# Patient Record
Sex: MALE | Race: BLACK OR AFRICAN AMERICAN | NOT HISPANIC OR LATINO | URBAN - METROPOLITAN AREA
[De-identification: names, ages, dates, MRNs, and addresses within clinical notes are randomized per-mention and may not be internally consistent; named-entity substitution may affect disease eponyms.]

---

## 2023-12-24 ENCOUNTER — EMERGENCY (EMERGENCY)
Facility: HOSPITAL | Age: 48
LOS: 1 days | Discharge: HOME | End: 2023-12-24
Admitting: EMERGENCY MEDICINE
Payer: OTHER MISCELLANEOUS

## 2023-12-24 VITALS
TEMPERATURE: 99 F | RESPIRATION RATE: 18 BRPM | OXYGEN SATURATION: 100 % | HEART RATE: 93 BPM | SYSTOLIC BLOOD PRESSURE: 177 MMHG | DIASTOLIC BLOOD PRESSURE: 124 MMHG

## 2023-12-24 VITALS
TEMPERATURE: 98 F | OXYGEN SATURATION: 98 % | HEART RATE: 98 BPM | DIASTOLIC BLOOD PRESSURE: 91 MMHG | SYSTOLIC BLOOD PRESSURE: 144 MMHG | RESPIRATION RATE: 17 BRPM

## 2023-12-24 PROCEDURE — 99284 EMERGENCY DEPT VISIT MOD MDM: CPT

## 2023-12-24 RX ORDER — OXYCODONE HYDROCHLORIDE 5 MG/1
1 TABLET ORAL
Qty: 20 | Refills: 0
Start: 2023-12-24 | End: 2023-12-28

## 2023-12-24 RX ORDER — ACETAMINOPHEN 500 MG
2 TABLET ORAL
Qty: 30 | Refills: 0
Start: 2023-12-24 | End: 2023-12-28

## 2023-12-24 RX ORDER — IBUPROFEN 200 MG
1 TABLET ORAL
Qty: 15 | Refills: 0
Start: 2023-12-24 | End: 2023-12-28

## 2023-12-24 NOTE — ED ADULT NURSE NOTE - NSFALLUNIVINTERV_ED_ALL_ED
Bed/Stretcher in lowest position, wheels locked, appropriate side rails in place/Call bell, personal items and telephone in reach/Instruct patient to call for assistance before getting out of bed/chair/stretcher/Non-slip footwear applied when patient is off stretcher/[place] to call system/Physically safe environment - no spills, clutter or unnecessary equipment/Purposeful proactive rounding/Room/bathroom lighting operational, light cord in reach Bed/Stretcher in lowest position, wheels locked, appropriate side rails in place/Call bell, personal items and telephone in reach/Instruct patient to call for assistance before getting out of bed/chair/stretcher/Non-slip footwear applied when patient is off stretcher/[place] to call system/Physically safe environment - no spills, clutter or unnecessary equipment/Purposeful proactive rounding/Room/bathroom lighting operational, light cord in reach

## 2023-12-24 NOTE — ED PROVIDER NOTE - CLINICAL SUMMARY MEDICAL DECISION MAKING FREE TEXT BOX
pt here for rib pain, requesting stronger pain meds to be sent. no new injuries or trauma. exam found pt uncomfortable sitting in chair, TENDERNESS AT RIGHT LATERAL 5-7th RIBS. no ecchymosis, deformity, swelling. no in acute distress. VSS except hypertensive.   pt brought paperwork from [place] that showed CT showed non-displaced 6-7th rib fracture on the right side, and he did not get prescription of percocet d/t system down time.   will give percocet and send to pharmacy.

## 2023-12-24 NOTE — ED ADULT TRIAGE NOTE - CHIEF COMPLAINT QUOTE
Pt c/o pain to R rib area. Struck with steel cable that brok from a crane yesterday. Seen at [facility] in [place] yesterday and dx with rib fracture to R rib 6 and 7. Denies SOB, diff breathing, CP, dizziness. Pt c/o pain to R rib area. Struck with steel cable that brok from a crane yesterday. Seen at [facility] in [place] yesterday and dx with rib fracture to R rib 6 and 7. Denies SOB, diff breathing, CP, dizziness. Pt c/o pain to R rib area. Struck with steel cable that broke from a crane yesterday. Seen at [facility] in [place] yesterday and dx with rib fracture to R rib 6 and 7. Resp even and unlabored. Denies SOB, diff breathing, CP, dizziness. Pt c/o pain to R rib area. Struck with steel cable that broke from a crane yesterday. Seen at [facility] in [place] yesterday and dx with rib fracture to R rib 6 and 7. Resp even and unlabored. Denies SOB, diff breathing, CP, dizziness.

## 2023-12-24 NOTE — ED PROVIDER NOTE - NSFOLLOWUPINSTRUCTIONS_ED_ALL_ED_FT
Take oxycodone 5 mg one pill once every 6 hours as needed for severe pain -- causes drowsiness; DO NOT drink alcohol, drive, or operate heavy machinery with this medication.  Caution federal law prohibits the transfer of this drug to any person other  than the person for whom it was prescribed. May cause drowsiness.  Alcohol may intensify this effect.  Use care when operating dangerous machinery.  This prescription cannot be refilled. Using more of this medication than prescribed may cause serious breathing problems.     Take Tylenol 650mg (Two 325 mg pills) every 4-6 hours as needed for pain or fevers.     Take Motrin 600 mg every 8 hours as needed for moderate pain or fevers -- take with food.    You can alternate Motrin and Tylenol for moderate pain every 2 hours.     Follow up with your PCP as soon as possible.     Use incentive spirometer 10 times every hour for 1 week or until you can follow up with your PCP.    Return to the ER if having worsening pain, fever, chills, chest pain, difficulty breathing.

## 2023-12-24 NOTE — ED ADULT NURSE NOTE - CHIEF COMPLAINT QUOTE
Pt c/o pain to R rib area. Struck with steel cable that broke from a crane yesterday. Seen at [facility] in [place] yesterday and dx with rib fracture to R rib 6 and 7. Resp even and unlabored. Denies SOB, diff breathing, CP, dizziness. Pt c/o pain to R rib area. Struck with steel cable that broke from a crane yesterday. Seen at [facility] in [place] yesterday and dx with rib fracture to R rib 6 and 7. Resp even and unlabored. Denies SOB, diff breathing, CP, dizziness.

## 2023-12-24 NOTE — ED PROVIDER NOTE - ED STEMI HIDDEN
July 11, 2018      MOUNA Cruz MD  [address] [facility] [address] [place] [address]           [facility]  [address] [facility] [address] [place] [address]  Phone: [phone]  Fax: [phone]          Patient: Crispin Mcguire   MR Number: [id number]   YOB: 1968   Date of Visit: 7/11/2018       Dear Dr. MOUNA Cruz:    Thank you for referring Crispin Mcguire to me for evaluation. Attached you will find relevant portions of my assessment and plan of care.    If you have questions, please do not hesitate to call me. I look forward to following Crispin Mcguire along with you.    Sincerely,    Cyndee Horvath NP    Enclosure  CC:  No Recipients    If you would like to receive this communication electronically, please contact [email or web address] or [phone] to request more information on [facility] [employer] access.    For providers and/or their staff who would like to refer a patient to [facility], please contact us through our one-stop-shop provider referral line, [facility] , at [phone].    If you feel you have received this communication in error or would no longer like to receive these types of communications, please e-mail [email or web address]         
hide

## 2023-12-24 NOTE — ED PROVIDER NOTE - PHYSICAL EXAMINATION
CONSTITUTIONAL: pt appears uncomfortable.  NEURO: Alert & oriented. Gait steady without assistance. Sensory and motor functions are grossly intact.  PSYCH: Mood appropriate. Thought processes intact.   NECK: Supple  CARD: Regular rate and rhythm, no murmurs  RESP: No accessory muscle use; breath sounds clear and equal bilaterally; no wheezes, rhonchi, or rales     MUSCULOSKELETAL/EXTREMITIES: FROM in all four extremities; no extremity edema.  SKIN: Warm; dry; no apparent lesions or exudate  CHEST: TENDERNESS AT RIGHT LATERAL 5-7th RIBS. no ecchymosis, deformity, swelling.

## 2023-12-24 NOTE — ED PROVIDER NOTE - OBJECTIVE STATEMENT
48-year-old male with no medical history, has rib fracture yesterday from a broken cable hitting him on the lateral right sided chest.  Patient went to [facility] in [place] where CT scan showed nondisplaced fracture at lateral right 6-7 ribs.  Patient was not prescribed Percocet because of system downtime. Patient was given incentive spirometer to go home with.  Patient continued to feel pain on the injured ribs.  Denies shortness of breath, dizziness, fever, nausea/vomiting. 48-year-old male with no medical history, has rib fracture yesterday from a broken cable hitting him on the lateral right sided chest.  Patient went to [facility] in [place] where CT scan showed nondisplaced fracture at lateral right 6-7 ribs.  Patient was not prescribed Percocet because of system downtime. Patient was given incentive spirometer to go home with.  Patient continued to feel pain on the injured ribs.  Denies shortness of breath, dizziness, fever, nausea/vomiting.

## 2023-12-24 NOTE — ED PROVIDER NOTE - PATIENT PORTAL LINK FT
You can access the FollowMyHealth Patient Portal offered by [facility] by registering at the following website: [email or web address]. By joining [facility]’s FollowMyHealth portal, you will also be able to view your health information using other applications (apps) compatible with our system. You can access the FollowMyHealth Patient Portal offered by [facility] by registering at the following website: [email or web address]. By joining [facility] [employer]s FollowMyHealth portal, you will also be able to view your health information using other applications (apps) compatible with our system.

## 2023-12-24 NOTE — ED ADULT NURSE NOTE - OBJECTIVE STATEMENT
48 year old male received to intake AAOx4 ambulatory. Pt c/o right rib pain s/p trauma yesterday from cable on a crane that hit him at work. pt diagnosed with rib fracture to ribs 6-7. 48 year old male received to intake AAOx4 ambulatory. Pt c/o right rib pain s/p trauma yesterday from cable on a crane that hit him at work. pt fell to ground, denies head injury/LOC/AC use. pt diagnosed with rib fracture to ribs 6-7. pt rates pain 10/10 at this time. pt taking motrin at home without relief. pt's wife will drive him home upon discharge. pt denies headache. dizziness, chest pain, shortness of breath, n/v/d, fever. respirations even and unlabored. vs as noted. meds given as ordered. bed in lowest position, call bell within reach